# Patient Record
Sex: MALE | Race: OTHER | NOT HISPANIC OR LATINO | Employment: PART TIME | URBAN - METROPOLITAN AREA
[De-identification: names, ages, dates, MRNs, and addresses within clinical notes are randomized per-mention and may not be internally consistent; named-entity substitution may affect disease eponyms.]

---

## 2024-02-02 ENCOUNTER — APPOINTMENT (OUTPATIENT)
Dept: RADIOLOGY | Facility: HOSPITAL | Age: 44
End: 2024-02-02

## 2024-02-02 ENCOUNTER — APPOINTMENT (OUTPATIENT)
Dept: CARDIOLOGY | Facility: HOSPITAL | Age: 44
End: 2024-02-02

## 2024-02-02 ENCOUNTER — HOSPITAL ENCOUNTER (EMERGENCY)
Facility: HOSPITAL | Age: 44
Discharge: LEFT BEFORE DISCHARGE | End: 2024-02-02
Attending: EMERGENCY MEDICINE

## 2024-02-02 VITALS
BODY MASS INDEX: 29.99 KG/M2 | DIASTOLIC BLOOD PRESSURE: 74 MMHG | HEART RATE: 71 BPM | SYSTOLIC BLOOD PRESSURE: 117 MMHG | OXYGEN SATURATION: 97 % | HEIGHT: 65 IN | WEIGHT: 180 LBS | TEMPERATURE: 97.9 F | RESPIRATION RATE: 20 BRPM

## 2024-02-02 DIAGNOSIS — R55 SYNCOPE, UNSPECIFIED SYNCOPE TYPE: Primary | ICD-10-CM
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
## 2024-02-02 LAB
ALBUMIN SERPL BCP-MCNC: 4.5 G/DL (ref 3.4–5)
ALP SERPL-CCNC: 70 U/L (ref 33–120)
ALT SERPL W P-5'-P-CCNC: 22 U/L (ref 10–52)
ANION GAP SERPL CALC-SCNC: 17 MMOL/L (ref 10–20)
AST SERPL W P-5'-P-CCNC: 20 U/L (ref 9–39)
ATRIAL RATE: 102 BPM
BASOPHILS # BLD AUTO: 0.06 X10*3/UL (ref 0–0.1)
BASOPHILS NFR BLD AUTO: 0.6 %
BILIRUB SERPL-MCNC: 0.7 MG/DL (ref 0–1.2)
BNP SERPL-MCNC: 10 PG/ML (ref 0–99)
BUN SERPL-MCNC: 8 MG/DL (ref 6–23)
CALCIUM SERPL-MCNC: 9.6 MG/DL (ref 8.6–10.3)
CARDIAC TROPONIN I PNL SERPL HS: 3 NG/L (ref 0–20)
CARDIAC TROPONIN I PNL SERPL HS: 4 NG/L (ref 0–20)
CHLORIDE SERPL-SCNC: 100 MMOL/L (ref 98–107)
CK SERPL-CCNC: 159 U/L (ref 0–325)
CO2 SERPL-SCNC: 23 MMOL/L (ref 21–32)
CREAT SERPL-MCNC: 1.07 MG/DL (ref 0.5–1.3)
EGFRCR SERPLBLD CKD-EPI 2021: 88 ML/MIN/1.73M*2
EOSINOPHIL # BLD AUTO: 0.16 X10*3/UL (ref 0–0.7)
EOSINOPHIL NFR BLD AUTO: 1.7 %
ERYTHROCYTE [DISTWIDTH] IN BLOOD BY AUTOMATED COUNT: 13.2 % (ref 11.5–14.5)
FLUAV RNA RESP QL NAA+PROBE: NOT DETECTED
FLUBV RNA RESP QL NAA+PROBE: NOT DETECTED
GLUCOSE SERPL-MCNC: 89 MG/DL (ref 74–99)
HCT VFR BLD AUTO: 45 % (ref 41–52)
HGB BLD-MCNC: 16.3 G/DL (ref 13.5–17.5)
IMM GRANULOCYTES # BLD AUTO: 0.07 X10*3/UL (ref 0–0.7)
IMM GRANULOCYTES NFR BLD AUTO: 0.7 % (ref 0–0.9)
INR PPP: 1.1 (ref 0.9–1.1)
LACTATE SERPL-SCNC: 1.2 MMOL/L (ref 0.4–2)
LACTATE SERPL-SCNC: 3.6 MMOL/L (ref 0.4–2)
LYMPHOCYTES # BLD AUTO: 2.28 X10*3/UL (ref 1.2–4.8)
LYMPHOCYTES NFR BLD AUTO: 23.7 %
MAGNESIUM SERPL-MCNC: 2.06 MG/DL (ref 1.6–2.4)
MCH RBC QN AUTO: 32 PG (ref 26–34)
MCHC RBC AUTO-ENTMCNC: 36.2 G/DL (ref 32–36)
MCV RBC AUTO: 88 FL (ref 80–100)
MONOCYTES # BLD AUTO: 0.85 X10*3/UL (ref 0.1–1)
MONOCYTES NFR BLD AUTO: 8.8 %
NEUTROPHILS # BLD AUTO: 6.21 X10*3/UL (ref 1.2–7.7)
NEUTROPHILS NFR BLD AUTO: 64.5 %
NRBC BLD-RTO: 0 /100 WBCS (ref 0–0)
P AXIS: 51 DEGREES
P OFFSET: 209 MS
P ONSET: 163 MS
PLATELET # BLD AUTO: 263 X10*3/UL (ref 150–450)
POTASSIUM SERPL-SCNC: 3.9 MMOL/L (ref 3.5–5.3)
PR INTERVAL: 124 MS
PROT SERPL-MCNC: 7.2 G/DL (ref 6.4–8.2)
PROTHROMBIN TIME: 11.9 SECONDS (ref 9.8–12.8)
Q ONSET: 225 MS
QRS COUNT: 17 BEATS
QRS DURATION: 78 MS
QT INTERVAL: 322 MS
QTC CALCULATION(BAZETT): 419 MS
QTC FREDERICIA: 384 MS
R AXIS: -9 DEGREES
RBC # BLD AUTO: 5.1 X10*6/UL (ref 4.5–5.9)
RSV RNA RESP QL NAA+PROBE: NOT DETECTED
SARS-COV-2 RNA RESP QL NAA+PROBE: NOT DETECTED
SODIUM SERPL-SCNC: 136 MMOL/L (ref 136–145)
T AXIS: 34 DEGREES
T OFFSET: 386 MS
VENTRICULAR RATE: 102 BPM
WBC # BLD AUTO: 9.6 X10*3/UL (ref 4.4–11.3)

## 2024-02-02 PROCEDURE — 83880 ASSAY OF NATRIURETIC PEPTIDE: CPT | Performed by: PHYSICIAN ASSISTANT

## 2024-02-02 PROCEDURE — 93005 ELECTROCARDIOGRAM TRACING: CPT

## 2024-02-02 PROCEDURE — 36415 COLL VENOUS BLD VENIPUNCTURE: CPT | Performed by: EMERGENCY MEDICINE

## 2024-02-02 PROCEDURE — 71275 CT ANGIOGRAPHY CHEST: CPT | Mod: FR

## 2024-02-02 PROCEDURE — 85025 COMPLETE CBC W/AUTO DIFF WBC: CPT | Performed by: PHYSICIAN ASSISTANT

## 2024-02-02 PROCEDURE — 71275 CT ANGIOGRAPHY CHEST: CPT | Mod: FOREIGN READ | Performed by: RADIOLOGY

## 2024-02-02 PROCEDURE — 99285 EMERGENCY DEPT VISIT HI MDM: CPT | Performed by: EMERGENCY MEDICINE

## 2024-02-02 PROCEDURE — 71045 X-RAY EXAM CHEST 1 VIEW: CPT

## 2024-02-02 PROCEDURE — 83735 ASSAY OF MAGNESIUM: CPT | Performed by: PHYSICIAN ASSISTANT

## 2024-02-02 PROCEDURE — 72125 CT NECK SPINE W/O DYE: CPT

## 2024-02-02 PROCEDURE — 84484 ASSAY OF TROPONIN QUANT: CPT | Performed by: PHYSICIAN ASSISTANT

## 2024-02-02 PROCEDURE — 87636 SARSCOV2 & INF A&B AMP PRB: CPT | Performed by: PHYSICIAN ASSISTANT

## 2024-02-02 PROCEDURE — 70450 CT HEAD/BRAIN W/O DYE: CPT | Performed by: RADIOLOGY

## 2024-02-02 PROCEDURE — 85610 PROTHROMBIN TIME: CPT | Performed by: EMERGENCY MEDICINE

## 2024-02-02 PROCEDURE — 2550000001 HC RX 255 CONTRASTS: Performed by: EMERGENCY MEDICINE

## 2024-02-02 PROCEDURE — 83605 ASSAY OF LACTIC ACID: CPT | Performed by: PHYSICIAN ASSISTANT

## 2024-02-02 PROCEDURE — 82550 ASSAY OF CK (CPK): CPT | Performed by: PHYSICIAN ASSISTANT

## 2024-02-02 PROCEDURE — 80053 COMPREHEN METABOLIC PANEL: CPT | Performed by: PHYSICIAN ASSISTANT

## 2024-02-02 PROCEDURE — 36415 COLL VENOUS BLD VENIPUNCTURE: CPT | Performed by: PHYSICIAN ASSISTANT

## 2024-02-02 PROCEDURE — 71045 X-RAY EXAM CHEST 1 VIEW: CPT | Mod: FOREIGN READ | Performed by: RADIOLOGY

## 2024-02-02 PROCEDURE — 70450 CT HEAD/BRAIN W/O DYE: CPT

## 2024-02-02 PROCEDURE — 72125 CT NECK SPINE W/O DYE: CPT | Performed by: RADIOLOGY

## 2024-02-02 RX ADMIN — IOHEXOL 75 ML: 350 INJECTION, SOLUTION INTRAVENOUS at 11:11

## 2024-02-02 ASSESSMENT — COLUMBIA-SUICIDE SEVERITY RATING SCALE - C-SSRS
2. HAVE YOU ACTUALLY HAD ANY THOUGHTS OF KILLING YOURSELF?: NO
6. HAVE YOU EVER DONE ANYTHING, STARTED TO DO ANYTHING, OR PREPARED TO DO ANYTHING TO END YOUR LIFE?: NO
1. IN THE PAST MONTH, HAVE YOU WISHED YOU WERE DEAD OR WISHED YOU COULD GO TO SLEEP AND NOT WAKE UP?: NO

## 2024-02-02 ASSESSMENT — PAIN SCALES - GENERAL
PAINLEVEL_OUTOF10: 0 - NO PAIN

## 2024-02-02 ASSESSMENT — LIFESTYLE VARIABLES
HAVE YOU EVER FELT YOU SHOULD CUT DOWN ON YOUR DRINKING: NO
EVER HAD A DRINK FIRST THING IN THE MORNING TO STEADY YOUR NERVES TO GET RID OF A HANGOVER: NO
HAVE PEOPLE ANNOYED YOU BY CRITICIZING YOUR DRINKING: NO
EVER FELT BAD OR GUILTY ABOUT YOUR DRINKING: NO

## 2024-02-02 ASSESSMENT — PAIN - FUNCTIONAL ASSESSMENT: PAIN_FUNCTIONAL_ASSESSMENT: 0-10

## 2024-02-02 NOTE — ED PROVIDER NOTES
HPI   Chief Complaint   Patient presents with    Fall     Pt states he passed out while at work pt son states he was vomiting blood and turned purple and was unconscious for 7 minutes.        44-year-old male patient comes in the emergency department today after he had a syncopal episode while at work.  Son is at bedside he said he witnessed this.  He states he kind of became weak fell down to the ground had a little bit of shaking but was difficult to arouse or get him to talk for about 7 minutes.  States he vomited once that had a little bit blood-tinged to it as well as vomiting up some foam.  Patient states he has mild headache mild neck pain but denies any vision changes, chest pain, shortness of breath, abdominal pain, fevers, chills.  Patient denies any history of seizures or epilepsy.  Patient has no complaints at this present time.  This purpose patient was brought to the emergency department today further evaluation.                          Jeremy Coma Scale Score: 15                  Patient History   No past medical history on file.  No past surgical history on file.  No family history on file.  Social History     Tobacco Use    Smoking status: Not on file    Smokeless tobacco: Not on file   Substance Use Topics    Alcohol use: Not on file    Drug use: Not on file       Physical Exam   ED Triage Vitals [02/02/24 0911]   Temperature Heart Rate Respirations BP   36.6 °C (97.9 °F) (!) 103 20 139/79      Pulse Ox Temp Source Heart Rate Source Patient Position   94 % Temporal Monitor Lying      BP Location FiO2 (%)     Left arm --       Physical Exam  Constitutional:       General: He is in acute distress (Moderate).      Appearance: Normal appearance. He is not ill-appearing.   HENT:      Head: Normocephalic and atraumatic.      Nose: Nose normal.   Eyes:      Extraocular Movements: Extraocular movements intact.      Conjunctiva/sclera: Conjunctivae normal.      Pupils: Pupils are equal, round, and  reactive to light.   Cardiovascular:      Rate and Rhythm: Normal rate and regular rhythm.   Pulmonary:      Effort: Pulmonary effort is normal. No respiratory distress.      Breath sounds: Normal breath sounds. No stridor. No wheezing.   Abdominal:      General: Abdomen is flat.      Palpations: Abdomen is soft.      Tenderness: There is no abdominal tenderness. There is no guarding.   Musculoskeletal:         General: Normal range of motion.      Cervical back: Normal range of motion.   Skin:     General: Skin is warm and dry.   Neurological:      General: No focal deficit present.      Mental Status: He is alert and oriented to person, place, and time. Mental status is at baseline.      Comments: NIH: 0   Psychiatric:         Mood and Affect: Mood normal.         ED Course & MDM   Diagnoses as of 02/02/24 1342   Syncope, unspecified syncope type   Fall, initial encounter       Medical Decision Making  44-year-old male patient comes in the emergency department today after he had a syncopal episode while at work.  Son is at bedside he said he witnessed this.  He states he kind of became weak fell down to the ground had a little bit of shaking but was difficult to arouse or get him to talk for about 7 minutes.  States he vomited once that had a little bit blood-tinged to it as well as vomiting up some foam.  Patient states he has mild headache mild neck pain but denies any vision changes, chest pain, shortness of breath, abdominal pain, fevers, chills.  Patient denies any history of seizures or epilepsy.  Patient has no complaints at this present time.  This purpose patient was brought to the emergency department today further evaluation.    CT study of the head, PE study, chest x-ray, EKG, laboratory studies were ordered to rule out acute intracranial bleed, cervical spine injury, brain edema, calvarial fracture, pulmonary emboli, pneumonia, pleural effusions, pneumothorax, electrolyte abnormalities, ACS,  leukocytosis, elevated lactate which would be consistent with seizure.    Patient has negative troponins, negative CK negative for influenza COVID-19 RSV.  Patient's lactate is elevated at 3.6 which could be consistent with potential seizure.  Patient's BMP is negative CMP is negative as well as CBC.  CT PE study is negative for pulmonary emboli does discuss some inflammatory infectious etiology.  Patient does not have anything to correlate with infectious etiology.  Patient CT study of the head is negative as well as CT study of the C-spine.  Chest x-ray shows some mild vascular congestion otherwise negative.    Will admit patient to the hospital for further evaluation for workup regarding syncope versus seizure.  Patient's son's story is consistent with seizures the patient sounds like he had a seizure was postictal for at least 7 minutes before he could begin to communicate with the son again.  Patient will benefit from further workup.    Historian is the patient as well as patient's son    Diagnosis: Syncope    History was obtained using Martti interpretation    I discussed the case with Dr. Blakely the hospitalist agrees admit the patient.  Went discussed all the results with the patient.  Patient states he feeling well and does not want to be admitted to the hospital.  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  I encouraged him not to do these things until he sees a neurologist as an outpatient.  Patient will be signing out AGAINST MEDICAL ADVICE.  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          Labs Reviewed   CBC WITH AUTO DIFFERENTIAL - Abnormal       Result Value    WBC 9.6      nRBC 0.0      RBC 5.10      Hemoglobin 16.3      Hematocrit 45.0      MCV 88      MCH 32.0      MCHC 36.2 (*)     RDW 13.2      Platelets 263      Neutrophils % 64.5       Immature Granulocytes %, Automated 0.7      Lymphocytes % 23.7      Monocytes % 8.8      Eosinophils % 1.7      Basophils % 0.6      Neutrophils Absolute 6.21      Immature Granulocytes Absolute, Automated 0.07      Lymphocytes Absolute 2.28      Monocytes Absolute 0.85      Eosinophils Absolute 0.16      Basophils Absolute 0.06     LACTATE - Abnormal    Lactate 3.6 (*)     Narrative:     Venipuncture immediately after or during the administration of Metamizole may lead to falsely low results. Testing should be performed immediately  prior to Metamizole dosing.   COMPREHENSIVE METABOLIC PANEL - Normal    Glucose 89      Sodium 136      Potassium 3.9      Chloride 100      Bicarbonate 23      Anion Gap 17      Urea Nitrogen 8      Creatinine 1.07      eGFR 88      Calcium 9.6      Albumin 4.5      Alkaline Phosphatase 70      Total Protein 7.2      AST 20      Bilirubin, Total 0.7      ALT 22     MAGNESIUM - Normal    Magnesium 2.06     B-TYPE NATRIURETIC PEPTIDE - Normal    BNP 10      Narrative:        <100 pg/mL - Heart failure unlikely  100-299 pg/mL - Intermediate probability of acute heart                  failure exacerbation. Correlate with clinical                  context and patient history.    >=300 pg/mL - Heart Failure likely. Correlate with clinical                  context and patient history.    BNP testing is performed using different testing methodology at [facility] than at other [facility]. Direct result comparisons should only be made within the same method.      SARS-COV-2 AND INFLUENZA A/B PCR - Normal    Flu A Result Not Detected      Flu B Result Not Detected      Coronavirus 2019, PCR Not Detected      Narrative:     This assay has received FDA Emergency Use Authorization (EUA) and  is only authorized for the duration of time that circumstances exist to justify the authorization of the emergency use of in vitro diagnostic tests for the detection of SARS-CoV-2 virus  and/or diagnosis of COVID-19 infection under section 564(b)(1) of the Act, 21 U.S.C. 360bbb-3(b)(1). Testing for SARS-CoV-2 is only recommended for patients who meet current clinical and/or epidemiological criteria as defined by federal, state, or local public health directives. This assay is an in vitro diagnostic nucleic acid amplification test for the qualitative detection of SARS-CoV-2, Influenza A, and Influenza B from nasopharyngeal specimens and has been validated for use at [facility]. Negative results do not preclude COVID-19 infections or Influenza A/B infections, and should not be used as the sole basis for diagnosis, treatment, or other management decisions. If Influenza A/B and RSV PCR results are negative, testing for Parainfluenza virus, Adenovirus and Metapneumovirus is routinely performed for [facility] pediatric oncology and intensive care inpatients, and is available on other patients by placing an add-on request.    RSV PCR - Normal    RSV PCR Not Detected      Narrative:     This assay is an FDA-cleared, in vitro diagnostic nucleic acid amplification test for the detection of RSV from nasopharyngeal specimens, and has been validated for use at [facility]. Negative results do not preclude RSV infections, and should not be used as the sole basis for diagnosis, treatment, or other management decisions. If Influenza A/B and RSV PCR results are negative, testing for Parainfluenza virus, Adenovirus and Metapneumovirus is routinely performed for pediatric oncology and intensive care inpatients at [facility], and is available on other patients by placing an add-on request.       SERIAL TROPONIN-INITIAL - Normal    Troponin I, High Sensitivity 3      Narrative:     Less than 99th percentile of normal range cutoff-  Female and children under 18 years old <14 ng/L; Male <21 ng/L: Negative  Repeat testing should be performed if clinically indicated.     Female and children  under 18 years old 14-50 ng/L; Male 21-50 ng/L:  Consistent with possible cardiac damage and possible increased clinical   risk. Serial measurements may help to assess extent of myocardial damage.     >50 ng/L: Consistent with cardiac damage, increased clinical risk and  myocardial infarction. Serial measurements may help assess extent of   myocardial damage.      NOTE: Children less than 1 year old may have higher baseline troponin   levels and results should be interpreted in conjunction with the overall   clinical context.     NOTE: Troponin I testing is performed using a different   testing methodology at [facility] than at other   [facility]. Direct result comparisons should only   be made within the same method.   SERIAL TROPONIN, 1 HOUR - Normal    Troponin I, High Sensitivity 4      Narrative:     Less than 99th percentile of normal range cutoff-  Female and children under 18 years old <14 ng/L; Male <21 ng/L: Negative  Repeat testing should be performed if clinically indicated.     Female and children under 18 years old 14-50 ng/L; Male 21-50 ng/L:  Consistent with possible cardiac damage and possible increased clinical   risk. Serial measurements may help to assess extent of myocardial damage.     >50 ng/L: Consistent with cardiac damage, increased clinical risk and  myocardial infarction. Serial measurements may help assess extent of   myocardial damage.      NOTE: Children less than 1 year old may have higher baseline troponin   levels and results should be interpreted in conjunction with the overall   clinical context.     NOTE: Troponin I testing is performed using a different   testing methodology at [facility] than at other   [facility]. Direct result comparisons should only   be made within the same method.   CREATINE KINASE - Normal    Creatine Kinase 159     LACTATE - Normal    Lactate 1.2      Narrative:     Venipuncture immediately after or during the  administration of Metamizole may lead to falsely low results. Testing should be performed immediately  prior to Metamizole dosing.   PROTIME-INR - Normal    Protime 11.9      INR 1.1     TROPONIN SERIES- (INITIAL, 1 HR)    Narrative:     The following orders were created for panel order Troponin I Series, High Sensitivity (0, 1 HR).  Procedure                               Abnormality         Status                     ---------                               -----------         ------                     Troponin I, High Sensiti...[[id number]]  Normal              Final result               Troponin, High Sensitivi...[[id number]]  Normal              Final result                 Please view results for these tests on the individual orders.        CT angio chest for pulmonary embolism   Final Result   No CT evidence of pulmonary embolism.   Mild bilateral groundglass opacities in the lungs suggestive of   inflammatory or infectious etiology. No pulmonary consolidation.   No pleural effusion or pneumothorax.   Signed by Car De La O MD      CT head wo IV contrast   Final Result   No acute intracranial abnormality. Consider follow-up with MRI as   warranted.             Signed by: Dru Bach 2/2/2024 11:52 AM   Dictation workstation:   [id number]      CT cervical spine wo IV contrast   Final Result   No evidence for an acute fracture or subluxation of the cervical   spine.        Signed by: Dru Bach 2/2/2024 12:05 PM   Dictation workstation:   [id number]      XR chest 1 view   Final Result   Mild vascular congestion.   Signed by Arsh Clemens MD                  Shared EDILMA Attestation:    This patient was seen by the advanced practice provider.  I personally saw the patient and made/approved the management plan and take responsibility for the patient management.    History: 44-year-old male presents after brief unresponsive episode.    Exam: Mildly tachycardic but regular rhythm cardiac exam with  clear breath sounds bilaterally.  Abdomen is soft and nontender.  Neurological exam is grossly intact.  NIH stroke scale is 0.  Negative Homans' sign bilaterally.    MDM: Syncope, seizure, stroke, intracranial bleed, ACS, arrhythmia, electrolyte disorder    I have seen and examined the patient, agree with the workup, evaluation, medical decision making, management and diagnosis.  The care plan has been discussed.    Armando Barahona MD        Procedure  ECG 12 lead    Performed by: Delbert Santiago PA-C  Authorized by: Delbert Santiago PA-C    Interpretation:     Interpretation: normal      Details:  My EKG interpretation  Rate:     ECG rate:  69    ECG rate assessment: normal    Rhythm:     Rhythm: sinus rhythm    ST segments:     ST segments:  Normal  T waves:     T waves: normal         Delbert Santiago PA-C  02/02/24 1348

## 2024-02-03 LAB
ATRIAL RATE: 69 BPM
P AXIS: 26 DEGREES
P OFFSET: 209 MS
P ONSET: 159 MS
PR INTERVAL: 132 MS
Q ONSET: 225 MS
QRS COUNT: 11 BEATS
QRS DURATION: 80 MS
QT INTERVAL: 384 MS
QTC CALCULATION(BAZETT): 411 MS
QTC FREDERICIA: 402 MS
R AXIS: -8 DEGREES
T AXIS: 18 DEGREES
T OFFSET: 417 MS
VENTRICULAR RATE: 69 BPM